# Patient Record
Sex: FEMALE | Race: WHITE
[De-identification: names, ages, dates, MRNs, and addresses within clinical notes are randomized per-mention and may not be internally consistent; named-entity substitution may affect disease eponyms.]

---

## 2019-01-24 ENCOUNTER — HOSPITAL ENCOUNTER (OUTPATIENT)
Dept: HOSPITAL 46 - DS | Age: 74
Discharge: HOME | End: 2019-01-24
Attending: SURGERY
Payer: MEDICARE

## 2019-01-24 VITALS — HEIGHT: 59 IN | BODY MASS INDEX: 30.64 KG/M2 | WEIGHT: 152.01 LBS

## 2019-01-24 DIAGNOSIS — Z79.82: ICD-10-CM

## 2019-01-24 DIAGNOSIS — E11.9: ICD-10-CM

## 2019-01-24 DIAGNOSIS — Z79.899: ICD-10-CM

## 2019-01-24 DIAGNOSIS — Z79.84: ICD-10-CM

## 2019-01-24 DIAGNOSIS — E03.9: ICD-10-CM

## 2019-01-24 DIAGNOSIS — C50.511: Primary | ICD-10-CM

## 2019-01-24 DIAGNOSIS — Z17.0: ICD-10-CM

## 2019-01-24 PROCEDURE — A9541 TC99M SULFUR COLLOID: HCPCS

## 2019-01-24 PROCEDURE — 0HBT0ZZ EXCISION OF RIGHT BREAST, OPEN APPROACH: ICD-10-PCS | Performed by: SURGERY

## 2019-01-24 PROCEDURE — 07B50ZX EXCISION OF RIGHT AXILLARY LYMPHATIC, OPEN APPROACH, DIAGNOSTIC: ICD-10-PCS | Performed by: SURGERY

## 2019-01-24 NOTE — NUR
1600 IN TO CHECK ON PT. PT STATES SHE IS FEELING BETTER. PT RATES PAIN 4/10
WITH MOVEMENT AND 2/10 IF RESTING. PT STATES THIS IS GOOD FOR HER. VITALS
STABLE. JUICE AND CRACKERS GIVEN. NO FURTHER NEEDS AT THIS TIME. DUAGHTER AT
BEDSIDE. NO FURTHER NEEDS AT THIS TIME.

## 2019-01-24 NOTE — NUR
PT ALERT, ORIENTED AND SUPPORTED BY HER DAUGHTER ANDREIA. PT SEEMED PREPARED,
TOLD VIRTUALLY NOONE ABOUT PROCEDURE. ANDREIA MENTIONED LATER THAT SHE WILL
ENCOURAGE PT TO TAKE ADVANTAGE OF CANCER PT HELP. PT REQUESTED PRAYER, WILL
FOLLOW AS NEEDED

## 2019-01-24 NOTE — NUR
1615 PT UP TO RESTROOM WITH ASSIST FROM RN. PT TOLERATED WELL. DENIES
DIZZINESS OR BEING LIGHT HEADED. PT READY TO DC. IV DC'D. PT DRESSING WITH
ASSIST OF DAUGHTER.

## 2019-01-24 NOTE — NUR
01/24/19 Lois Avril Smith
1326- PT ARRIVES TO PACU FROM OR ON 6 L VIA MASK. SATS 97%. RESP EVEN
AND UNLABORED. PT REACTIVE TO VERBAL STIMULUS BUT UNABLE TO ANSWER
QUESTIONS. SURGICAL DRESSING C/D/I. SCDS ON
 
1326- PT TITRATED TO RA. RESP EVEN AND UNLABORED. PT OPENS EYES AND
RESPONDS TO VERBAL STIMULUS. PT DENIES PAIN/NAUSEA. RA SATS 95%.
 
1333- RA SATS 90%. PT ENC TO TAKE DEEP BREATHES. PT BACK TO ASLEEP AND
SATS REMAIN >90%. RESP EVEN AND UNALBORED. BLOOD GLUCOSE 140 POST OP.
 
1334- PT DENIES PAIN/NAUSEA.

## 2019-01-24 NOTE — NUR
1400 PT ARRIVED FROM PACU, VOMITING. LITTLE EMESIS NOTED, ZOFRAN GIVEN BY AP
PACU RN. REPORT RECVED. VITALS STABLE, O2 SATS NOTED TO BE 91% ON RA. PT RATES
PAIN 5-6/10, STATES "I CAN FEEL IT MORE NOW." PER DR. JUÁREZ MAY GIVE OFIRMEV 1
GM, ORDER REPEATED BACK. MAY ALSO GIVE MORPHINE 2-6 MG, Q30 MINS PRN IF
OFIRMEV UNSUCCESSFUL. DISCUSSED PAIN MANAGEMENT WITH PT. ICE CHIPS AND WASH
CLOTH GIVEN FOR COMFORT. PT RESTING, WILL CONTINUE TO MONITOR.

## 2019-01-24 NOTE — NUR
IN TO CHECK ON PT, PT AWAKE TALKING WITH DAUGHTER. PT STATES SHE IS STILL
PAINFUL, PAIN WORSE WITH MOVEMENT. WILL CONTINUE TO MONITOR PAIN. VITALS
STABLE, 02 ON RA. DISCUSSED DISCHARGE CRITERIA. WILL CONTINUE TO MONITOR PT.

## 2019-01-27 NOTE — OR
[facility]
                                    [address], [place]  [address]
_________________________________________________________________________________________
                                                                 Signed   
 
 
DATE OF OPERATION:
01/24/2019
 
SURGEON:
Judith Juárez MD
 
PREOPERATIVE DIAGNOSIS:
Right lower outer infiltrating ductal breast carcinoma (ER, MO positive, HER2/kezia
negative). 
 
POSTOPERATIVE DIAGNOSIS:
Right lower outer infiltrating ductal breast carcinoma (ER, MO positive, HER2/kezia
negative). 
 
FINDINGS:
Frozen pathology, sentinel lymph nodes negative for metastatic disease x4.
 
PROCEDURE:
1. Injection of methylene blue for sentinel lymph node identification.
2. Deep axillary lymph node biopsy x4.
3. Right partial mastectomy with oncoplastic closure using preoperative localizing wire.
 
ANESTHESIA:
General LMA; Rajiv Velarde CRNA.
 
INDICATION:
This 73-year-old white woman is a patient of Dr. Vee Deleon and has never done breast
self exam before.  She underwent a screening mammogram in December, which noted an
abnormality in the lower outer aspect of the right breast.  Image-guided biopsy was
undertaken by Dr. Foster, which showed an infiltrating ductal carcinoma, nuclear grade
2/3, mitotic grade 2/3 and the lesion markedly ER and MO positive, HER2/kezia negative.
There is no palpable lesion, no regional adenopathy by clinical exam.  Her options of
management were reviewed in detail, which include partial mastectomy with radiation
therapy and sentinel lymph node identification, possible axillary dissection versus
total mastectomy, sentinel lymph node dissection and possible axillary dissection.  She
prefers breast conservation therapy if at all possible. 
 
The risks of operation include, but is not limited to bleeding, infection, cosmetic
deformity, need for additional treatment should metastatic disease be found and a
certain recommendation for radiation therapy as well.  Understanding the factors related
to breast conservation approach to her breast cancer, she wished to proceed. 
 
 
    Electronically Signed By: JUDITH JUÁREZ MD  01/27/19 1501
_________________________________________________________________________________________
PATIENT NAME:     VALERIA HOLLAND                     
MEDICAL RECORD #: [id number]            OPERATIVE REPORT              
          ACCT #: [id number]  
DATE OF BIRTH:   [birth year]            REPORT #: [id number]      
PHYSICIAN:        JUDITH JUÁREZ MD                 
PCP:              VEE DELEON MD                
REPORT IS CONFIDENTIAL AND NOT TO BE RELEASED WITHOUT AUTHORIZATION
 
 
                                  [facility]
                                    [address], [place]  [address]
_________________________________________________________________________________________
                                                                 Signed   
 
 
FINDINGS:
Good uptake to sentinel lymph nodes was undertaken based on radionuclide imaging study
as well as methylene blue dye.  The lesion was nonpalpable, but was well localized and
within the parenchyma of the breast and the lesion was easily identified.  It was nearly
to the level of the chest wall.  Wide excision was undertaken with needle-localized
image guidance and an oncoplastic approach was taken including an inframammary incision
to excise the primary tumor.  A clinically negative margin is maintained and an
additional tissue was excised to more reliably provide for negative margin.  Frozen
pathology showed 4 sentinel lymph nodes to be negative for metastatic disease. 
 
DESCRIPTION OF PROCEDURE:
The patient was brought to the operating room, given a general LMA type anesthetic.
Preoperative antibiotic Ancef was given.  Sequential compression device stockings used
and heparin subcutaneously administered.  She had been received from radiology suite
having undergone radionuclide imaging localization of sentinel lymph nodes as well as
passage of a localizing wire for the primary lesion, which was nonpalpable.  1 mL of
methylene blue was injected beneath the epidermis in the periareolar area.  The breast
axilla and chest wall were then prepared with a spray Betadine solution.  Using the
C-Trak gamma probe, the area of highest uptake in the right axilla was identified and a
transverse incision was made there.  Dissection was carried through the subcutaneous
tissue with all due care.  Electrocautery was used for hemostasis.  Ultimately, a blue
avid hot lymph node was noted, it was about a cm and a half in size.  This was excised
after application of few hemoclips.  It was passed as sentinel lymph node number one.
Three additional nodes were dissected free from the axillary space using the gamma probe
device for localization.  Notably, the other three lymph nodes did not uptake methylene
blue dye.  The wound was then packed with gauze as the sentinel lymph nodes were being
analyzed by the pathologist. 
 
Attention was turned to the primary lesion.  The wire emanated from the inferior lateral
aspect of the right breast.  It appeared that an inframammary incision would be most
cosmetically acceptable.  On that basis, a small incision was made in the inferior
lateral aspect of the mammary crease.  Dissection carried through the dermis and
subcutaneous tissue with electrocautery.  Elevation of the breast was undertaken enough
that dissection allowed for identification of the wire, which was then delivered into
the wound itself.  An Allis clamp was applied to the parenchyma and wide resection was
undertaken with electrocautery.  Deep in the substance of the breast, relatively close
to the chest wall, palpable abnormality could be noted.  Wide resection was undertaken
with this ultimately excising a generous specimen.  This was sent for specimen
radiograph, which confirmed the offending lesion to be in the resected specimen.
Additional tissue in the region of the biopsy cavity in the depths and in the superior
and lateral aspect was excised for an additional negative margin.  Irrigation was
undertaken with saline and ultimately sterile water for its tumor lytic effect.
 
    Electronically Signed By: JUDITH JUÁREZ MD  01/27/19 1501
_________________________________________________________________________________________
PATIENT NAME:     VALERIA HOLLAND                     
MEDICAL RECORD #: [id number]            OPERATIVE REPORT              
          ACCT #: [id number]  
DATE OF BIRTH:   [birth year]            REPORT #: [id number]      
PHYSICIAN:        JUDITH JUÁREZ MD                 
PCP:              VEE DELEON MD                
REPORT IS CONFIDENTIAL AND NOT TO BE RELEASED WITHOUT AUTHORIZATION
 
 
                                  [facility]
                                    [address] [place], [place]  [address]
_________________________________________________________________________________________
                                                                 Signed   
 
 
Hemostasis was assured with electrocautery. 
 
The axilla was reinspected showing good hemostasis.  By this point, sentinel lymph nodes
were all reported as negative.  Closure of the wound was undertaken with interrupted 2-0
Vicryl in deep fatty layer and a running subcuticular 3-0 Vicryl for the skin.
Steri-Strips were applied. 
 
Parenchymal reapproximation was undertaken in the breast defect with 2-0 Vicryl suture
minimizing the cosmetic deformity noted.  The skin was then closed with running
subcuticular 3-0 Vicryl.  Steri-Strips were applied to both wounds as was a Mepilex
silver sponge dressing and an OpSite.  The patient was ultimately extubated and 
transferred to recovery room in good condition having suffered no complications.
Sponge, needle, and instrument counts were reported as correct x3. 
 
 
 
            ________________________________________
            Judith Juárez MD 
 
 
/MODL
Job #:  [id number]
DD:  01/24/2019 13:34:08
DT:  01/24/2019 20:21:03
 
cc:            MD Lilibeth Latham MD
 
 
Copies:  LILIBETH FOSTER MD
~
 
 
 
 
 
 
 
 
 
 
 
    Electronically Signed By: JUDITH JUÁREZ MD  01/27/19 1501
_________________________________________________________________________________________
PATIENT NAME:     VALERIA HOLLAND                     
MEDICAL RECORD #: [id number]            OPERATIVE REPORT              
          ACCT #: [id number]  
DATE OF BIRTH:   [birth year]            REPORT #: [id number]      
PHYSICIAN:        JUDITH JUÁREZ MD                 
PCP:              VEE DELEON MD                
REPORT IS CONFIDENTIAL AND NOT TO BE RELEASED WITHOUT AUTHORIZATION